# Patient Record
Sex: FEMALE | Race: WHITE | URBAN - METROPOLITAN AREA
[De-identification: names, ages, dates, MRNs, and addresses within clinical notes are randomized per-mention and may not be internally consistent; named-entity substitution may affect disease eponyms.]

---

## 2024-01-30 ENCOUNTER — OFFICE (OUTPATIENT)
Dept: URBAN - METROPOLITAN AREA CLINIC 109 | Facility: CLINIC | Age: 34
Setting detail: OPHTHALMOLOGY
End: 2024-01-30
Payer: COMMERCIAL

## 2024-01-30 DIAGNOSIS — H16.223: ICD-10-CM

## 2024-01-30 DIAGNOSIS — H53.9: ICD-10-CM

## 2024-01-30 PROCEDURE — 99203 OFFICE O/P NEW LOW 30 MIN: CPT | Performed by: OPHTHALMOLOGY

## 2024-01-30 PROCEDURE — 92083 EXTENDED VISUAL FIELD XM: CPT | Performed by: OPHTHALMOLOGY

## 2024-01-30 PROCEDURE — 92250 FUNDUS PHOTOGRAPHY W/I&R: CPT | Performed by: OPHTHALMOLOGY

## 2024-01-30 ASSESSMENT — REFRACTION_AUTOREFRACTION
OD_CYLINDER: -0.50
OS_CYLINDER: -0.50
OD_SPHERE: +0.75
OD_AXIS: 112
OS_SPHERE: +0.75
OS_AXIS: 164

## 2024-01-30 ASSESSMENT — SPHEQUIV_DERIVED
OD_SPHEQUIV: 0.5
OS_SPHEQUIV: 0.5

## 2024-01-30 ASSESSMENT — CONFRONTATIONAL VISUAL FIELD TEST (CVF)
OS_FINDINGS: FULL
OD_FINDINGS: FULL

## 2024-01-30 ASSESSMENT — SUPERFICIAL PUNCTATE KERATITIS (SPK)
OD_SPK: 1+
OS_SPK: 1+

## 2024-02-02 ENCOUNTER — OFFICE (OUTPATIENT)
Facility: LOCATION | Age: 34
Setting detail: OPHTHALMOLOGY
End: 2024-02-02
Payer: COMMERCIAL

## 2024-02-02 DIAGNOSIS — H81.4: ICD-10-CM

## 2024-02-02 DIAGNOSIS — H53.433: ICD-10-CM

## 2024-02-02 DIAGNOSIS — G43.009: ICD-10-CM

## 2024-02-02 DIAGNOSIS — H53.9: ICD-10-CM

## 2024-02-02 DIAGNOSIS — H16.223: ICD-10-CM

## 2024-02-02 PROCEDURE — 92014 COMPRE OPH EXAM EST PT 1/>: CPT | Performed by: OPHTHALMOLOGY

## 2024-02-02 PROCEDURE — 92133 CPTRZD OPH DX IMG PST SGM ON: CPT | Performed by: OPHTHALMOLOGY

## 2024-02-02 PROCEDURE — 92083 EXTENDED VISUAL FIELD XM: CPT | Performed by: OPHTHALMOLOGY

## 2024-02-02 ASSESSMENT — REFRACTION_AUTOREFRACTION
OS_SPHERE: +0.25
OD_CYLINDER: -0.75
OS_AXIS: 150
OD_SPHERE: +1.00
OD_SPHERE: +2.00
OS_CYLINDER: -0.75
OS_AXIS: 157
OD_AXIS: 107
OS_CYLINDER: -1.00
OS_SPHERE: +0.50
OD_CYLINDER: -0.50
OD_AXIS: 099

## 2024-02-02 ASSESSMENT — REFRACTION_MANIFEST
OS_AXIS: 160
OS_VA1: 20/20
OS_CYLINDER: -1.00
OD_AXIS: 105
OD_SPHERE: +1.00
OS_SPHERE: +0.25
OD_CYLINDER: -0.50
OD_VA1: 20/20

## 2024-02-02 ASSESSMENT — SUPERFICIAL PUNCTATE KERATITIS (SPK)
OS_SPK: 1+
OD_SPK: 1+

## 2024-02-02 ASSESSMENT — SPHEQUIV_DERIVED
OS_SPHEQUIV: -0.25
OD_SPHEQUIV: 1.625
OS_SPHEQUIV: -0.25
OD_SPHEQUIV: 0.75
OS_SPHEQUIV: 0.125
OD_SPHEQUIV: 0.75

## 2024-02-02 ASSESSMENT — CONFRONTATIONAL VISUAL FIELD TEST (CVF)
OD_FINDINGS: FULL
OS_FINDINGS: FULL

## 2024-03-08 ENCOUNTER — OFFICE (OUTPATIENT)
Facility: LOCATION | Age: 34
Setting detail: OPHTHALMOLOGY
End: 2024-03-08
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
## 2024-03-08 PROCEDURE — NO SHOW FE NO SHOW FEE: Performed by: OPHTHALMOLOGY

## 2024-05-17 ENCOUNTER — APPOINTMENT (OUTPATIENT)
Dept: GASTROENTEROLOGY | Facility: CLINIC | Age: 34
End: 2024-05-17

## 2024-06-07 ENCOUNTER — APPOINTMENT (OUTPATIENT)
Dept: GASTROENTEROLOGY | Facility: CLINIC | Age: 34
End: 2024-06-07
Payer: COMMERCIAL

## 2024-06-07 ENCOUNTER — TRANSCRIPTION ENCOUNTER (OUTPATIENT)
Age: 34
End: 2024-06-07

## 2024-06-07 VITALS
OXYGEN SATURATION: 97 % | DIASTOLIC BLOOD PRESSURE: 66 MMHG | HEART RATE: 100 BPM | BODY MASS INDEX: 35.88 KG/M2 | HEIGHT: 62 IN | WEIGHT: 195 LBS | SYSTOLIC BLOOD PRESSURE: 110 MMHG

## 2024-06-07 DIAGNOSIS — R10.811 RIGHT UPPER QUADRANT ABDOMINAL TENDERNESS: ICD-10-CM

## 2024-06-07 DIAGNOSIS — K21.9 GASTRO-ESOPHAGEAL REFLUX DISEASE W/OUT ESOPHAGITIS: ICD-10-CM

## 2024-06-07 PROBLEM — Z00.00 ENCOUNTER FOR PREVENTIVE HEALTH EXAMINATION: Status: ACTIVE | Noted: 2024-06-07

## 2024-06-07 PROCEDURE — 99204 OFFICE O/P NEW MOD 45 MIN: CPT

## 2024-06-07 RX ORDER — OMEPRAZOLE 40 MG/1
40 CAPSULE, DELAYED RELEASE ORAL
Qty: 30 | Refills: 1 | Status: ACTIVE | COMMUNITY
Start: 2024-06-07 | End: 1900-01-01

## 2024-06-07 NOTE — ASSESSMENT
[FreeTextEntry1] : Patient with epigastric abdominal pain and tenderness as well as ruq abdominal pain and tenderness patient with melena  will schedule patient for upper gastrointestinal endoscopy at an ambulatory surgical center to assess for mucosal disease such as peptic ulcer disease  Because of the ruq pain and tenderness and family history of cholelithiasis, will order ruq abdominal ultrasound  new test order upper gi endoscopy  new test ordered ruq abdominal ultrasound   Moderate degree of complexity of medical decision making involved in this patient encounter

## 2024-06-07 NOTE — HISTORY OF PRESENT ILLNESS
[FreeTextEntry1] : Chief complaint: abdominal pain and bloating   HPI: Patient presents for evaluation and management of multiple complex gastrointestinal and medical signs and symptoms. Subacute onset of epigastric abdominal pain with a burning quality. There are no exacerbating or ameliorating factors. The abdominal pain is non radiating. Associated factors include nausea.   Patient with alteration in bowel habits with melena, constipation at times   Patient with positive family history of cholelithiasis; has a brother who required cholecystectomy for symptomatic cholelithiasis

## 2024-06-07 NOTE — PHYSICAL EXAM

## 2024-06-26 ENCOUNTER — APPOINTMENT (OUTPATIENT)
Dept: GASTROENTEROLOGY | Facility: GI CENTER | Age: 34
End: 2024-06-26

## 2024-07-11 ENCOUNTER — APPOINTMENT (OUTPATIENT)
Dept: GASTROENTEROLOGY | Facility: CLINIC | Age: 34
End: 2024-07-11